# Patient Record
Sex: MALE | Race: WHITE | NOT HISPANIC OR LATINO | Employment: FULL TIME | URBAN - METROPOLITAN AREA
[De-identification: names, ages, dates, MRNs, and addresses within clinical notes are randomized per-mention and may not be internally consistent; named-entity substitution may affect disease eponyms.]

---

## 2023-08-10 ENCOUNTER — HOSPITAL ENCOUNTER (OUTPATIENT)
Dept: DATA CONVERSION | Facility: HOSPITAL | Age: 53
Discharge: HOME | End: 2023-08-10

## 2023-08-10 DIAGNOSIS — R73.01 IMPAIRED FASTING GLUCOSE: ICD-10-CM

## 2023-08-10 DIAGNOSIS — E78.00 PURE HYPERCHOLESTEROLEMIA, UNSPECIFIED: ICD-10-CM

## 2023-08-10 LAB
ALBUMIN SERPL-MCNC: 4.6 GM/DL (ref 3.5–5)
ALBUMIN/GLOB SERPL: 1.5 RATIO (ref 1.5–3)
ALP BLD-CCNC: 64 U/L (ref 35–125)
ALT SERPL-CCNC: 38 U/L (ref 5–40)
ANION GAP SERPL CALCULATED.3IONS-SCNC: 12 MMOL/L (ref 0–19)
APPEARANCE PLAS: CLEAR
AST SERPL-CCNC: 35 U/L (ref 5–40)
BILIRUB SERPL-MCNC: 1.1 MG/DL (ref 0.1–1.2)
BUN SERPL-MCNC: 16 MG/DL (ref 8–25)
BUN/CREAT SERPL: 17.8 RATIO (ref 8–21)
CALCIUM SERPL-MCNC: 9.6 MG/DL (ref 8.5–10.4)
CHLORIDE SERPL-SCNC: 94 MMOL/L (ref 97–107)
CHOLEST SERPL-MCNC: 191 MG/DL (ref 133–200)
CHOLEST/HDLC SERPL: 3 RATIO
CO2 SERPL-SCNC: 28 MMOL/L (ref 24–31)
COLOR SPUN FLD: YELLOW
CREAT SERPL-MCNC: 0.9 MG/DL (ref 0.4–1.6)
FASTING STATUS PATIENT QL REPORTED: NORMAL
GFR SERPL CREATININE-BSD FRML MDRD: 101 ML/MIN/1.73 M2
GLOBULIN SER-MCNC: 3 G/DL (ref 1.9–3.7)
GLUCOSE SERPL-MCNC: 91 MG/DL (ref 65–99)
HBA1C MFR BLD: 6.2 % (ref 4–6)
HDLC SERPL-MCNC: 63 MG/DL
LDLC SERPL CALC-MCNC: 106 MG/DL (ref 65–130)
POTASSIUM SERPL-SCNC: 3.7 MMOL/L (ref 3.4–5.1)
PROT SERPL-MCNC: 7.6 G/DL (ref 5.9–7.9)
SODIUM SERPL-SCNC: 134 MMOL/L (ref 133–145)
TRIGL SERPL-MCNC: 111 MG/DL (ref 40–150)

## 2023-09-16 PROBLEM — L65.9 ALOPECIA: Status: ACTIVE | Noted: 2023-09-16

## 2023-09-16 PROBLEM — R73.01 IMPAIRED FASTING GLUCOSE: Status: ACTIVE | Noted: 2023-09-16

## 2023-09-16 PROBLEM — G56.00 CARPAL TUNNEL SYNDROME: Status: ACTIVE | Noted: 2023-09-16

## 2023-09-16 PROBLEM — M62.9 DISORDER OF MUSCLE: Status: ACTIVE | Noted: 2023-09-16

## 2023-09-16 PROBLEM — E78.00 PURE HYPERCHOLESTEROLEMIA: Status: ACTIVE | Noted: 2023-09-16

## 2023-09-16 PROBLEM — I10 ESSENTIAL HYPERTENSION: Status: ACTIVE | Noted: 2023-09-16

## 2023-09-16 PROBLEM — E78.00 HIGH CHOLESTEROL: Status: ACTIVE | Noted: 2023-09-16

## 2023-09-16 PROBLEM — N50.3 CYST OF EPIDIDYMIS: Status: ACTIVE | Noted: 2023-09-16

## 2023-09-16 PROBLEM — G47.30 SLEEP APNEA: Status: ACTIVE | Noted: 2023-09-16

## 2023-09-16 RX ORDER — PHENYLPROPANOLAMINE/CLEMASTINE 75-1.34MG
200 TABLET, EXTENDED RELEASE ORAL
COMMUNITY

## 2023-09-16 RX ORDER — PRAVASTATIN SODIUM 40 MG/1
40 TABLET ORAL DAILY
COMMUNITY
Start: 2019-11-06

## 2023-09-16 RX ORDER — LOSARTAN POTASSIUM 50 MG/1
50 TABLET ORAL NIGHTLY
COMMUNITY
Start: 2021-01-06 | End: 2023-11-17

## 2023-09-16 RX ORDER — CHLORTHALIDONE 25 MG/1
25 TABLET ORAL EVERY MORNING
COMMUNITY
Start: 2022-01-28 | End: 2024-01-31

## 2023-11-17 DIAGNOSIS — I10 ESSENTIAL (PRIMARY) HYPERTENSION: ICD-10-CM

## 2023-11-17 RX ORDER — LOSARTAN POTASSIUM 50 MG/1
TABLET ORAL
Qty: 90 TABLET | Refills: 2 | Status: SHIPPED | OUTPATIENT
Start: 2023-11-17

## 2024-01-25 DIAGNOSIS — R73.01 IMPAIRED FASTING GLUCOSE: ICD-10-CM

## 2024-01-25 DIAGNOSIS — E78.00 PURE HYPERCHOLESTEROLEMIA, UNSPECIFIED: Primary | ICD-10-CM

## 2024-01-31 DIAGNOSIS — I10 ESSENTIAL (PRIMARY) HYPERTENSION: ICD-10-CM

## 2024-01-31 RX ORDER — CHLORTHALIDONE 25 MG/1
TABLET ORAL
Qty: 90 TABLET | Refills: 2 | Status: SHIPPED | OUTPATIENT
Start: 2024-01-31

## 2024-03-01 ENCOUNTER — LAB (OUTPATIENT)
Dept: LAB | Facility: LAB | Age: 54
End: 2024-03-01
Payer: COMMERCIAL

## 2024-03-01 DIAGNOSIS — E78.00 PURE HYPERCHOLESTEROLEMIA, UNSPECIFIED: ICD-10-CM

## 2024-03-01 DIAGNOSIS — R73.01 IMPAIRED FASTING GLUCOSE: ICD-10-CM

## 2024-03-01 LAB
ALBUMIN SERPL-MCNC: 4.6 G/DL (ref 3.5–5)
ALP BLD-CCNC: 67 U/L (ref 35–125)
ALT SERPL-CCNC: 28 U/L (ref 5–40)
ANION GAP SERPL CALC-SCNC: 12 MMOL/L
AST SERPL-CCNC: 28 U/L (ref 5–40)
BILIRUB SERPL-MCNC: 0.8 MG/DL (ref 0.1–1.2)
BUN SERPL-MCNC: 16 MG/DL (ref 8–25)
CALCIUM SERPL-MCNC: 9.9 MG/DL (ref 8.5–10.4)
CHLORIDE SERPL-SCNC: 97 MMOL/L (ref 97–107)
CHOLEST SERPL-MCNC: 174 MG/DL (ref 133–200)
CHOLEST/HDLC SERPL: 3.3 {RATIO}
CO2 SERPL-SCNC: 28 MMOL/L (ref 24–31)
CREAT SERPL-MCNC: 1 MG/DL (ref 0.4–1.6)
EGFRCR SERPLBLD CKD-EPI 2021: 89 ML/MIN/1.73M*2
EST. AVERAGE GLUCOSE BLD GHB EST-MCNC: 128 MG/DL
GLUCOSE SERPL-MCNC: 94 MG/DL (ref 65–99)
HBA1C MFR BLD: 6.1 %
HDLC SERPL-MCNC: 53 MG/DL
LDLC SERPL CALC-MCNC: 102 MG/DL (ref 65–130)
POTASSIUM SERPL-SCNC: 4.4 MMOL/L (ref 3.4–5.1)
PROT SERPL-MCNC: 7 G/DL (ref 5.9–7.9)
SODIUM SERPL-SCNC: 137 MMOL/L (ref 133–145)
TRIGL SERPL-MCNC: 96 MG/DL (ref 40–150)

## 2024-03-01 PROCEDURE — 80061 LIPID PANEL: CPT

## 2024-03-01 PROCEDURE — 83036 HEMOGLOBIN GLYCOSYLATED A1C: CPT

## 2024-03-01 PROCEDURE — 36415 COLL VENOUS BLD VENIPUNCTURE: CPT

## 2024-03-01 PROCEDURE — 80053 COMPREHEN METABOLIC PANEL: CPT

## 2024-03-20 ENCOUNTER — APPOINTMENT (OUTPATIENT)
Dept: PRIMARY CARE | Facility: CLINIC | Age: 54
End: 2024-03-20
Payer: COMMERCIAL

## 2024-04-10 ENCOUNTER — APPOINTMENT (OUTPATIENT)
Dept: PRIMARY CARE | Facility: CLINIC | Age: 54
End: 2024-04-10
Payer: COMMERCIAL

## 2024-04-15 PROBLEM — N52.9 ERECTILE DYSFUNCTION: Status: ACTIVE | Noted: 2024-04-15

## 2024-04-15 RX ORDER — SILDENAFIL 100 MG/1
TABLET, FILM COATED ORAL
COMMUNITY

## 2024-04-16 ENCOUNTER — OFFICE VISIT (OUTPATIENT)
Dept: PRIMARY CARE | Facility: CLINIC | Age: 54
End: 2024-04-16
Payer: COMMERCIAL

## 2024-04-16 VITALS
DIASTOLIC BLOOD PRESSURE: 73 MMHG | TEMPERATURE: 97.9 F | OXYGEN SATURATION: 97 % | SYSTOLIC BLOOD PRESSURE: 114 MMHG | HEART RATE: 91 BPM | HEIGHT: 74 IN | BODY MASS INDEX: 25.93 KG/M2 | WEIGHT: 202 LBS

## 2024-04-16 DIAGNOSIS — R73.01 IMPAIRED FASTING GLUCOSE: ICD-10-CM

## 2024-04-16 DIAGNOSIS — I10 ESSENTIAL HYPERTENSION: ICD-10-CM

## 2024-04-16 DIAGNOSIS — Z00.00 ROUTINE MEDICAL EXAM: Primary | ICD-10-CM

## 2024-04-16 DIAGNOSIS — E78.00 HIGH CHOLESTEROL: ICD-10-CM

## 2024-04-16 DIAGNOSIS — Z12.5 SCREENING FOR PROSTATE CANCER: ICD-10-CM

## 2024-04-16 PROCEDURE — 3074F SYST BP LT 130 MM HG: CPT | Performed by: INTERNAL MEDICINE

## 2024-04-16 PROCEDURE — 99396 PREV VISIT EST AGE 40-64: CPT | Performed by: INTERNAL MEDICINE

## 2024-04-16 PROCEDURE — 1036F TOBACCO NON-USER: CPT | Performed by: INTERNAL MEDICINE

## 2024-04-16 PROCEDURE — 3078F DIAST BP <80 MM HG: CPT | Performed by: INTERNAL MEDICINE

## 2024-04-16 ASSESSMENT — ENCOUNTER SYMPTOMS
ABDOMINAL PAIN: 0
TREMORS: 0
PALPITATIONS: 0
FEVER: 0
PHOTOPHOBIA: 0
HEADACHES: 0
FREQUENCY: 0
WEAKNESS: 0
ARTHRALGIAS: 0
COLOR CHANGE: 0
DYSURIA: 0
COUGH: 0
CHILLS: 0
SHORTNESS OF BREATH: 0
BRUISES/BLEEDS EASILY: 0
DIARRHEA: 0

## 2024-04-16 ASSESSMENT — PATIENT HEALTH QUESTIONNAIRE - PHQ9
1. LITTLE INTEREST OR PLEASURE IN DOING THINGS: NOT AT ALL
SUM OF ALL RESPONSES TO PHQ9 QUESTIONS 1 AND 2: 0
2. FEELING DOWN, DEPRESSED OR HOPELESS: NOT AT ALL

## 2024-04-16 ASSESSMENT — PAIN SCALES - GENERAL: PAINLEVEL: 0-NO PAIN

## 2024-04-16 NOTE — PROGRESS NOTES
[facility]: [facility]  PHYSICAL EXAM      Abhi Stern is a 54 y.o. male that is presenting today for Annual Exam.    Assessment/Plan   Diagnoses and all orders for this visit:  Routine medical exam  Comments:  Dermatology for yearly skin check good idea.  Screening for prostate cancer  -     PSA; Future  Essential hypertension  Comments:  BP doing well.  High cholesterol  Comments:  Doing well, pravastatin.  Orders:  -     Comprehensive Metabolic Panel; Future  -     Lipid Panel; Future  Impaired fasting glucose  Comments:  HGA1C 6.1%, low sugar avod Juices, sweets. Limit potatoes, breads, pastas and rice. Walking 30 minutes after meals.  Orders:  -     CBC and Auto Differential; Future  -     Hemoglobin A1C; Future    Subjective   Wellness, Diet, exercise. HTN, chol, Doing well. Work, family doing well.    Review of Systems   Constitutional:  Negative for chills and fever.   HENT:  Negative for congestion.    Eyes:  Negative for photophobia and visual disturbance.   Respiratory:  Negative for cough and shortness of breath.    Cardiovascular:  Negative for chest pain and palpitations.   Gastrointestinal:  Negative for abdominal pain and diarrhea.   Endocrine: Negative for cold intolerance and heat intolerance.   Genitourinary:  Negative for dysuria and frequency.   Musculoskeletal:  Negative for arthralgias.   Skin:  Negative for color change.   Neurological:  Negative for tremors, weakness and headaches.   Hematological:  Does not bruise/bleed easily.      Objective   Vitals:    04/16/24 1658   BP: 114/73   Pulse: 91   Temp: 36.6 °C (97.9 °F)   SpO2: 97%     Body mass index is 25.94 kg/m².  Physical Exam  Constitutional:       General: He is not in acute distress.     Appearance: He is not toxic-appearing.   HENT:      Head: Normocephalic and atraumatic.      Right Ear: Tympanic membrane and ear canal normal.      Left Ear: Tympanic membrane and ear canal normal.      Nose: Nose normal.       Mouth/Throat:      Pharynx: Oropharynx is clear.   Eyes:      Extraocular Movements: Extraocular movements intact.      Pupils: Pupils are equal, round, and reactive to light.   Cardiovascular:      Rate and Rhythm: Normal rate and regular rhythm.      Heart sounds: Normal heart sounds. No murmur heard.  Pulmonary:      Breath sounds: Normal breath sounds.   Abdominal:      General: Bowel sounds are normal. There is no distension.      Palpations: Abdomen is soft. There is no mass.      Tenderness: There is no abdominal tenderness.   Musculoskeletal:         General: Deformity (pectus excavatum) present. No swelling or tenderness.      Right lower leg: No edema.      Left lower leg: No edema.   Skin:     General: Skin is warm and dry.   Neurological:      General: No focal deficit present.      Mental Status: He is oriented to person, place, and time.      Sensory: No sensory deficit.      Motor: No weakness.      Deep Tendon Reflexes: Reflexes normal.     Diagnostic Results   Lab Results   Component Value Date    GLUCOSE 94 03/01/2024    CALCIUM 9.9 03/01/2024     03/01/2024    K 4.4 03/01/2024    CO2 28 03/01/2024    CL 97 03/01/2024    BUN 16 03/01/2024    CREATININE 1.00 03/01/2024     Lab Results   Component Value Date    ALT 28 03/01/2024    AST 28 03/01/2024    ALKPHOS 67 03/01/2024    BILITOT 0.8 03/01/2024     Lab Results   Component Value Date    WBC 6.5 02/17/2023    HGB 15.7 02/17/2023    HCT 48.3 02/17/2023    MCV 91.8 02/17/2023     02/17/2023     Lab Results   Component Value Date    CHOL 174 03/01/2024    CHOL 191 08/10/2023    CHOL 216 (H) 02/17/2023     Lab Results   Component Value Date    HDL 53.0 03/01/2024    HDL 63 08/10/2023    HDL 55 02/17/2023     Lab Results   Component Value Date    LDLCALC 102 03/01/2024    LDLCALC 106 08/10/2023    LDLCALC 138 (H) 02/17/2023     Lab Results   Component Value Date    TRIG 96 03/01/2024    TRIG 111 08/10/2023    TRIG 115 02/17/2023  "    No components found for: \"CHOLHDL\"  Lab Results   Component Value Date    HGBA1C 6.1 (H) 03/01/2024     Other labs not included in the list above were reviewed either before or during this encounter.    History   History reviewed. No pertinent past medical history.  History reviewed. No pertinent surgical history.  Family History   Problem Relation Name Age of Onset   • Hypertension Father     • Heart disease Father       Social History     Socioeconomic History   • Marital status:      Spouse name: Not on file   • Number of children: Not on file   • Years of education: Not on file   • Highest education level: Not on file   Occupational History   • Not on file   Tobacco Use   • Smoking status: Never     Passive exposure: Past   • Smokeless tobacco: Never   Vaping Use   • Vaping status: Never Used   Substance and Sexual Activity   • Alcohol use: Yes   • Drug use: Never   • Sexual activity: Not on file   Other Topics Concern   • Not on file   Social History Narrative   • Not on file     Social Determinants of Health     Financial Resource Strain: Not on file   Food Insecurity: Not on file   Transportation Needs: Not on file   Physical Activity: Not on file   Stress: Not on file   Social Connections: Not on file   Intimate Partner Violence: Not on file   Housing Stability: Not on file     Allergies   Allergen Reactions   • Atorvastatin Other     mental fog     Current Outpatient Medications on File Prior to Visit   Medication Sig Dispense Refill   • chlorthalidone (Hygroton) 25 mg tablet TAKE 1 TABLET BY MOUTH EVERY DAY IN THE MORNING WITH FOOD FOR 90 DAYS 90 tablet 2   • ibuprofen (Advil Migraine) capsule Take 1 capsule (200 mg) by mouth. As needed     • losartan (Cozaar) 50 mg tablet TAKE 1 TABLET BY MOUTH EVERY DAY AT BEDTIME FOR 90 DAYS 90 tablet 2   • multivitamin capsule Take 1 capsule by mouth once daily.     • pravastatin (Pravachol) 40 mg tablet Take 1 tablet (40 mg) by mouth once daily.     • " sildenafil (Viagra) 100 mg tablet Take by mouth.       No current facility-administered medications on file prior to visit.     Immunization History   Administered Date(s) Administered   • Flu vaccine, quadrivalent, no egg protein, age 6 month or greater (FLUCELVAX) 01/06/2021   • Influenza, injectable, quadrivalent 10/03/2014   • Influenza, seasonal, injectable 01/25/2013   • Pfizer Purple Cap SARS-CoV-2 10/18/2021, 11/08/2021   • Tdap vaccine, age 7 year and older (BOOSTRIX, ADACEL) 07/08/2009, 11/01/2019     Patient's medical history was reviewed and updated either before or during this encounter.       Cirilo Mustafa MD

## 2024-04-16 NOTE — PATIENT INSTRUCTIONS
Late September labs 1 week before apt.        Shingrix shingles vaccine series of 2  by 2 to 3 months, Can be obtained at the local pharmacies.      Diagnoses and all orders for this visit:  Routine medical exam  Comments:  Dermatology for yearly skin check good idea.  Screening for prostate cancer  -     PSA; Future  Essential hypertension  Comments:  BP doing well.  High cholesterol  Comments:  Doing well, pravastatin.  Orders:  -     Comprehensive Metabolic Panel; Future  -     Lipid Panel; Future  Impaired fasting glucose  Comments:  HGA1C 6.1%, low sugar avod Juices, sweets. Limit potatoes, breads, pastas and rice. Walking 30 minutes after meals.  Orders:  -     CBC and Auto Differential; Future  -     Hemoglobin A1C; Future   
.

## 2024-06-24 DIAGNOSIS — E78.00 PURE HYPERCHOLESTEROLEMIA, UNSPECIFIED: ICD-10-CM

## 2024-06-25 RX ORDER — PRAVASTATIN SODIUM 40 MG/1
40 TABLET ORAL DAILY
Qty: 90 TABLET | Refills: 0 | Status: SHIPPED | OUTPATIENT
Start: 2024-06-25

## 2024-08-22 DIAGNOSIS — I10 ESSENTIAL (PRIMARY) HYPERTENSION: ICD-10-CM

## 2024-08-22 RX ORDER — LOSARTAN POTASSIUM 50 MG/1
50 TABLET ORAL NIGHTLY
Qty: 90 TABLET | Refills: 2 | Status: SHIPPED | OUTPATIENT
Start: 2024-08-22

## 2024-09-25 ENCOUNTER — OFFICE VISIT (OUTPATIENT)
Dept: PRIMARY CARE | Facility: CLINIC | Age: 54
End: 2024-09-25
Payer: COMMERCIAL

## 2024-09-25 VITALS
BODY MASS INDEX: 24.78 KG/M2 | OXYGEN SATURATION: 99 % | SYSTOLIC BLOOD PRESSURE: 132 MMHG | WEIGHT: 193 LBS | HEART RATE: 72 BPM | DIASTOLIC BLOOD PRESSURE: 84 MMHG

## 2024-09-25 DIAGNOSIS — Z13.1 SCREENING FOR DIABETES MELLITUS: ICD-10-CM

## 2024-09-25 DIAGNOSIS — Z23 ENCOUNTER FOR IMMUNIZATION: ICD-10-CM

## 2024-09-25 DIAGNOSIS — I10 ESSENTIAL HYPERTENSION: ICD-10-CM

## 2024-09-25 DIAGNOSIS — E78.2 MIXED HYPERLIPIDEMIA: Primary | ICD-10-CM

## 2024-09-25 DIAGNOSIS — Z12.5 SCREENING FOR PROSTATE CANCER: ICD-10-CM

## 2024-09-25 DIAGNOSIS — E78.00 PURE HYPERCHOLESTEROLEMIA, UNSPECIFIED: ICD-10-CM

## 2024-09-25 PROBLEM — N50.3 CYST OF EPIDIDYMIS: Status: RESOLVED | Noted: 2023-09-16 | Resolved: 2024-09-25

## 2024-09-25 PROBLEM — G56.00 CARPAL TUNNEL SYNDROME: Status: RESOLVED | Noted: 2023-09-16 | Resolved: 2024-09-25

## 2024-09-25 PROCEDURE — 3075F SYST BP GE 130 - 139MM HG: CPT | Performed by: INTERNAL MEDICINE

## 2024-09-25 PROCEDURE — 3079F DIAST BP 80-89 MM HG: CPT | Performed by: INTERNAL MEDICINE

## 2024-09-25 PROCEDURE — 90656 IIV3 VACC NO PRSV 0.5 ML IM: CPT | Performed by: INTERNAL MEDICINE

## 2024-09-25 PROCEDURE — 90471 IMMUNIZATION ADMIN: CPT | Performed by: INTERNAL MEDICINE

## 2024-09-25 PROCEDURE — 99214 OFFICE O/P EST MOD 30 MIN: CPT | Performed by: INTERNAL MEDICINE

## 2024-09-25 RX ORDER — PRAVASTATIN SODIUM 40 MG/1
40 TABLET ORAL DAILY
Qty: 90 TABLET | Refills: 1 | Status: SHIPPED | OUTPATIENT
Start: 2024-09-25

## 2024-09-25 ASSESSMENT — LIFESTYLE VARIABLES
AUDIT-C TOTAL SCORE: 1
HOW OFTEN DO YOU HAVE A DRINK CONTAINING ALCOHOL: MONTHLY OR LESS
HOW OFTEN DO YOU HAVE SIX OR MORE DRINKS ON ONE OCCASION: NEVER
HOW MANY STANDARD DRINKS CONTAINING ALCOHOL DO YOU HAVE ON A TYPICAL DAY: 1 OR 2
SKIP TO QUESTIONS 9-10: 1

## 2024-09-25 ASSESSMENT — ENCOUNTER SYMPTOMS
DEPRESSION: 0
LOSS OF SENSATION IN FEET: 0
OCCASIONAL FEELINGS OF UNSTEADINESS: 0

## 2024-09-25 ASSESSMENT — PAIN SCALES - GENERAL: PAINLEVEL: 0-NO PAIN

## 2024-09-25 ASSESSMENT — PATIENT HEALTH QUESTIONNAIRE - PHQ9
SUM OF ALL RESPONSES TO PHQ9 QUESTIONS 1 AND 2: 0
2. FEELING DOWN, DEPRESSED OR HOPELESS: NOT AT ALL
1. LITTLE INTEREST OR PLEASURE IN DOING THINGS: NOT AT ALL

## 2024-09-25 NOTE — PROGRESS NOTES
[facility]: [facility]  PROGRESS NOTE      Abhi Stern is a 54 y.o. male that is being seen  today for Establish Care.  Subjective   Patient is a 54-year-old male with history of hypertension and hyperlipidemia who is being seen for follow-up visit as well as to establish a new primary care physician.  Patient's blood pressure is fairly controlled with current medication.  Patient lab work reviewed and cholesterol levels have been stable.  Patient denies any issues with the medications.  Patient is due for flu shot.  PSA level has been stable.  Patient is up-to-date on screening colonoscopy      ROS  Negative for fever or chills  Negative for sore throat, ear pain, nasal discharge  Negative for cough, shortness of breath or wheezing  Negative for chest pain, palpitations, swelling of legs  Negative for abdominal pain, constipation, diarrhea, blood in the stools  Negative for urinary complaints  Negative for headache, dizziness, weakness or numbness  Negative for joint pain  Negative for depression or anxiety  All other systems reviewed and were negative   Vitals:    09/25/24 1623   BP: 132/84   Pulse: 72   SpO2: 99%      Vitals:    09/25/24 1623   Weight: 87.5 kg (193 lb)     Body mass index is 24.78 kg/m².  Physical Exam  Constitutional: Patient does not appear to be in any acute distress  Head and Face: NCAT  Eyes: Normal external exam, EOMI  ENT: Normal external inspection of ears and nose. Oropharynx normal.  Cardiovascular: RRR, S1/S2, no murmurs, rubs, or gallops, radial pulses +2, no edema of extremities  Pulmonary: CTAB, no respiratory distress.  Abdomen: +BS, soft, non-tender, nondistended, no guarding or rebound, no masses noted  MSK: No joint swelling, normal movements of all extremities. Range of motion- normal.  Skin- No lesions, contusions, or erythema.  Peripheral puslses palpable bilaterally 2+  Neuro: AAO X3, Cranial nerves 2-12 grossly intact,DTR 2+ in all 4 limbs    Psychiatric: Judgment intact. Appropriate mood and behavior    LABS   [unfilled]  Lab Results   Component Value Date    GLUCOSE 94 03/01/2024    CALCIUM 9.9 03/01/2024     03/01/2024    K 4.4 03/01/2024    CO2 28 03/01/2024    CL 97 03/01/2024    BUN 16 03/01/2024    CREATININE 1.00 03/01/2024     Lab Results   Component Value Date    ALT 28 03/01/2024    AST 28 03/01/2024    ALKPHOS 67 03/01/2024    BILITOT 0.8 03/01/2024     Lab Results   Component Value Date    WBC 6.5 02/17/2023    HGB 15.7 02/17/2023    HCT 48.3 02/17/2023    MCV 91.8 02/17/2023     02/17/2023     Lab Results   Component Value Date    CHOL 174 03/01/2024    CHOL 191 08/10/2023    CHOL 216 (H) 02/17/2023     Lab Results   Component Value Date    HDL 53.0 03/01/2024    HDL 63 08/10/2023    HDL 55 02/17/2023     Lab Results   Component Value Date    LDLCALC 102 03/01/2024    LDLCALC 106 08/10/2023    LDLCALC 138 (H) 02/17/2023     Lab Results   Component Value Date    TRIG 96 03/01/2024    TRIG 111 08/10/2023    TRIG 115 02/17/2023     Lab Results   Component Value Date    HGBA1C 6.1 (H) 03/01/2024     Other labs not included in the list above were reviewed either before or during this encounter.    History    Past Medical History:   Diagnosis Date    Carpal tunnel syndrome 09/16/2023    Cyst of epididymis 09/16/2023     No past surgical history on file.  Family History   Problem Relation Name Age of Onset    Hypertension Father      Heart disease Father       Allergies   Allergen Reactions    Atorvastatin Other     mental fog     Current Outpatient Medications on File Prior to Visit   Medication Sig Dispense Refill    chlorthalidone (Hygroton) 25 mg tablet TAKE 1 TABLET BY MOUTH EVERY DAY IN THE MORNING WITH FOOD FOR 90 DAYS 90 tablet 2    ibuprofen (Advil Migraine) capsule Take 1 capsule (200 mg) by mouth. As needed      losartan (Cozaar) 50 mg tablet TAKE 1 TABLET BY MOUTH EVERY DAY AT BEDTIME 90 tablet 2    multivitamin capsule  Take 1 capsule by mouth once daily.      sildenafil (Viagra) 100 mg tablet Take by mouth.      [DISCONTINUED] pravastatin (Pravachol) 40 mg tablet TAKE 1 TABLET BY MOUTH EVERY DAY 90 tablet 0     No current facility-administered medications on file prior to visit.     Immunization History   Administered Date(s) Administered    Flu vaccine, quadrivalent, no egg protein, age 6 month or greater (FLUCELVAX) 01/06/2021    Influenza, injectable, quadrivalent 10/03/2014    Influenza, seasonal, injectable 01/25/2013    Pfizer Purple Cap SARS-CoV-2 10/18/2021, 11/08/2021    Tdap vaccine, age 7 year and older (BOOSTRIX, ADACEL) 07/08/2009, 11/01/2019     Patient's medical history was reviewed and updated either before or during this encounter.  ASSESSMENT / PLAN:  Diagnoses and all orders for this visit:  Mixed hyperlipidemia  -     CBC; Future  -     Comprehensive Metabolic Panel; Future  -     Lipid Panel; Future  Encounter for immunization  Essential hypertension  -     CBC; Future  -     Comprehensive Metabolic Panel; Future  Screening for diabetes mellitus  -     Hemoglobin A1C; Future  Screening for prostate cancer  -     Prostate Specific Antigen; Future  Other orders  -     Flu vaccine, trivalent, preservative free, age 6 months and greater (Fluarix/Fluzone/Flulaval)    Patient seen for follow-up visit.  Patient has been doing well.  Blood pressure is fairly controlled with current medication.  Cholesterol levels have been stable.  Patient will be seen in 1 year for follow-up.      Anthony Wolfe MD

## 2024-09-27 ENCOUNTER — LAB (OUTPATIENT)
Dept: LAB | Facility: LAB | Age: 54
End: 2024-09-27
Payer: COMMERCIAL

## 2024-09-27 DIAGNOSIS — E78.2 MIXED HYPERLIPIDEMIA: ICD-10-CM

## 2024-09-27 DIAGNOSIS — Z12.5 SCREENING FOR PROSTATE CANCER: ICD-10-CM

## 2024-09-27 DIAGNOSIS — Z13.1 SCREENING FOR DIABETES MELLITUS: ICD-10-CM

## 2024-09-27 LAB
ALBUMIN SERPL BCP-MCNC: 4.8 G/DL (ref 3.4–5)
ALP SERPL-CCNC: 55 U/L (ref 33–120)
ALT SERPL W P-5'-P-CCNC: 31 U/L (ref 10–52)
ANION GAP SERPL CALCULATED.3IONS-SCNC: 11 MMOL/L (ref 10–20)
AST SERPL W P-5'-P-CCNC: 29 U/L (ref 9–39)
BILIRUB SERPL-MCNC: 1 MG/DL (ref 0–1.2)
BUN SERPL-MCNC: 16 MG/DL (ref 6–23)
CALCIUM SERPL-MCNC: 10.1 MG/DL (ref 8.6–10.3)
CHLORIDE SERPL-SCNC: 97 MMOL/L (ref 98–107)
CHOLEST SERPL-MCNC: 193 MG/DL (ref 0–199)
CHOLEST/HDLC SERPL: 3.1 {RATIO}
CO2 SERPL-SCNC: 33 MMOL/L (ref 21–32)
CREAT SERPL-MCNC: 0.78 MG/DL (ref 0.5–1.3)
EGFRCR SERPLBLD CKD-EPI 2021: >90 ML/MIN/1.73M*2
ERYTHROCYTE [DISTWIDTH] IN BLOOD BY AUTOMATED COUNT: 12.9 % (ref 11.5–14.5)
EST. AVERAGE GLUCOSE BLD GHB EST-MCNC: 117 MG/DL
GLUCOSE SERPL-MCNC: 97 MG/DL (ref 74–99)
HBA1C MFR BLD: 5.7 %
HCT VFR BLD AUTO: 47.5 % (ref 41–52)
HDLC SERPL-MCNC: 62.3 MG/DL
HGB BLD-MCNC: 15.6 G/DL (ref 13.5–17.5)
LDLC SERPL CALC-MCNC: 105 MG/DL
MCH RBC QN AUTO: 29.7 PG (ref 26–34)
MCHC RBC AUTO-ENTMCNC: 32.8 G/DL (ref 32–36)
MCV RBC AUTO: 90 FL (ref 80–100)
NON HDL CHOLESTEROL: 131 MG/DL (ref 0–149)
NRBC BLD-RTO: 0 /100 WBCS (ref 0–0)
PLATELET # BLD AUTO: 259 X10*3/UL (ref 150–450)
POTASSIUM SERPL-SCNC: 5.3 MMOL/L (ref 3.5–5.3)
PROT SERPL-MCNC: 7.5 G/DL (ref 6.4–8.2)
PSA SERPL-MCNC: 0.79 NG/ML
RBC # BLD AUTO: 5.26 X10*6/UL (ref 4.5–5.9)
SODIUM SERPL-SCNC: 136 MMOL/L (ref 136–145)
TRIGL SERPL-MCNC: 131 MG/DL (ref 0–149)
VLDL: 26 MG/DL (ref 0–40)
WBC # BLD AUTO: 5.8 X10*3/UL (ref 4.4–11.3)

## 2024-09-27 PROCEDURE — 80061 LIPID PANEL: CPT

## 2024-09-27 PROCEDURE — 83036 HEMOGLOBIN GLYCOSYLATED A1C: CPT

## 2024-09-27 PROCEDURE — 84153 ASSAY OF PSA TOTAL: CPT

## 2024-09-27 PROCEDURE — 80053 COMPREHEN METABOLIC PANEL: CPT

## 2024-09-27 PROCEDURE — 36415 COLL VENOUS BLD VENIPUNCTURE: CPT

## 2024-09-27 PROCEDURE — 85027 COMPLETE CBC AUTOMATED: CPT

## 2024-09-30 ENCOUNTER — TELEPHONE (OUTPATIENT)
Dept: PRIMARY CARE | Facility: CLINIC | Age: 54
End: 2024-09-30
Payer: COMMERCIAL

## 2024-10-09 DIAGNOSIS — N52.9 ERECTILE DYSFUNCTION, UNSPECIFIED ERECTILE DYSFUNCTION TYPE: Primary | ICD-10-CM

## 2024-10-09 RX ORDER — SILDENAFIL 100 MG/1
100 TABLET, FILM COATED ORAL DAILY PRN
Qty: 4 TABLET | Refills: 3 | Status: SHIPPED | OUTPATIENT
Start: 2024-10-09

## 2024-11-20 DIAGNOSIS — I10 ESSENTIAL (PRIMARY) HYPERTENSION: ICD-10-CM

## 2024-11-20 RX ORDER — CHLORTHALIDONE 25 MG/1
25 TABLET ORAL
Qty: 90 TABLET | Refills: 1 | Status: SHIPPED | OUTPATIENT
Start: 2024-11-20

## 2025-02-18 DIAGNOSIS — E78.00 PURE HYPERCHOLESTEROLEMIA, UNSPECIFIED: ICD-10-CM

## 2025-02-18 RX ORDER — PRAVASTATIN SODIUM 40 MG/1
40 TABLET ORAL DAILY
Qty: 90 TABLET | Refills: 1 | Status: SHIPPED | OUTPATIENT
Start: 2025-02-18

## 2025-04-15 ENCOUNTER — TELEPHONE (OUTPATIENT)
Dept: PRIMARY CARE | Facility: CLINIC | Age: 55
End: 2025-04-15
Payer: COMMERCIAL

## 2025-04-15 DIAGNOSIS — I10 ESSENTIAL HYPERTENSION: Primary | ICD-10-CM

## 2025-04-15 DIAGNOSIS — E78.00 PURE HYPERCHOLESTEROLEMIA: ICD-10-CM

## 2025-04-15 ASSESSMENT — PROMIS GLOBAL HEALTH SCALE
RATE_QUALITY_OF_LIFE: VERY GOOD
CARRYOUT_PHYSICAL_ACTIVITIES: COMPLETELY
CARRYOUT_SOCIAL_ACTIVITIES: VERY GOOD
RATE_GENERAL_HEALTH: VERY GOOD
RATE_SOCIAL_SATISFACTION: VERY GOOD
RATE_PHYSICAL_HEALTH: VERY GOOD
RATE_AVERAGE_PAIN: 2
RATE_AVERAGE_FATIGUE: MILD
RATE_MENTAL_HEALTH: VERY GOOD
EMOTIONAL_PROBLEMS: RARELY

## 2025-04-15 NOTE — TELEPHONE ENCOUNTER
Coming in ppt Tuesday next week, no labs ordered pt calling to see what you would like ordered please advise,    [phone]

## 2025-04-17 LAB
ALBUMIN SERPL-MCNC: 4.7 G/DL (ref 3.6–5.1)
ALBUMIN SERPL-MCNC: 4.7 G/DL (ref 3.6–5.1)
ALP SERPL-CCNC: 54 U/L (ref 35–144)
ALP SERPL-CCNC: 57 U/L (ref 35–144)
ALT SERPL-CCNC: 25 U/L (ref 9–46)
ALT SERPL-CCNC: 26 U/L (ref 9–46)
ANION GAP SERPL CALCULATED.4IONS-SCNC: 7 MMOL/L (CALC) (ref 7–17)
ANION GAP SERPL CALCULATED.4IONS-SCNC: 8 MMOL/L (CALC) (ref 7–17)
AST SERPL-CCNC: 26 U/L (ref 10–35)
AST SERPL-CCNC: 27 U/L (ref 10–35)
BILIRUB SERPL-MCNC: 0.9 MG/DL (ref 0.2–1.2)
BILIRUB SERPL-MCNC: 0.9 MG/DL (ref 0.2–1.2)
BUN SERPL-MCNC: 18 MG/DL (ref 7–25)
BUN SERPL-MCNC: 18 MG/DL (ref 7–25)
CALCIUM SERPL-MCNC: 9.6 MG/DL (ref 8.6–10.3)
CALCIUM SERPL-MCNC: 9.6 MG/DL (ref 8.6–10.3)
CHLORIDE SERPL-SCNC: 99 MMOL/L (ref 98–110)
CHLORIDE SERPL-SCNC: 99 MMOL/L (ref 98–110)
CHOLEST SERPL-MCNC: 171 MG/DL
CHOLEST/HDLC SERPL: 2.7 (CALC)
CO2 SERPL-SCNC: 32 MMOL/L (ref 20–32)
CO2 SERPL-SCNC: 33 MMOL/L (ref 20–32)
CREAT SERPL-MCNC: 0.79 MG/DL (ref 0.7–1.3)
CREAT SERPL-MCNC: 0.82 MG/DL (ref 0.7–1.3)
EGFRCR SERPLBLD CKD-EPI 2021: 102 ML/MIN/1.73M2
EGFRCR SERPLBLD CKD-EPI 2021: 104 ML/MIN/1.73M2
ERYTHROCYTE [DISTWIDTH] IN BLOOD BY AUTOMATED COUNT: 12.4 % (ref 11–15)
GLUCOSE SERPL-MCNC: 94 MG/DL (ref 65–99)
GLUCOSE SERPL-MCNC: 96 MG/DL (ref 65–99)
HCT VFR BLD AUTO: 43.8 % (ref 38.5–50)
HDLC SERPL-MCNC: 64 MG/DL
HGB BLD-MCNC: 14.5 G/DL (ref 13.2–17.1)
LDLC SERPL CALC-MCNC: 90 MG/DL (CALC)
MCH RBC QN AUTO: 30.1 PG (ref 27–33)
MCHC RBC AUTO-ENTMCNC: 33.1 G/DL (ref 32–36)
MCV RBC AUTO: 91.1 FL (ref 80–100)
NONHDLC SERPL-MCNC: 107 MG/DL (CALC)
PLATELET # BLD AUTO: 249 THOUSAND/UL (ref 140–400)
PMV BLD REES-ECKER: 9.8 FL (ref 7.5–12.5)
POTASSIUM SERPL-SCNC: 4.2 MMOL/L (ref 3.5–5.3)
POTASSIUM SERPL-SCNC: 4.3 MMOL/L (ref 3.5–5.3)
PROT SERPL-MCNC: 7 G/DL (ref 6.1–8.1)
PROT SERPL-MCNC: 7 G/DL (ref 6.1–8.1)
RBC # BLD AUTO: 4.81 MILLION/UL (ref 4.2–5.8)
SODIUM SERPL-SCNC: 139 MMOL/L (ref 135–146)
SODIUM SERPL-SCNC: 139 MMOL/L (ref 135–146)
TRIGL SERPL-MCNC: 82 MG/DL
WBC # BLD AUTO: 5 THOUSAND/UL (ref 3.8–10.8)

## 2025-04-22 ENCOUNTER — OFFICE VISIT (OUTPATIENT)
Dept: PRIMARY CARE | Facility: CLINIC | Age: 55
End: 2025-04-22
Payer: COMMERCIAL

## 2025-04-22 VITALS
OXYGEN SATURATION: 98 % | DIASTOLIC BLOOD PRESSURE: 80 MMHG | HEART RATE: 77 BPM | SYSTOLIC BLOOD PRESSURE: 130 MMHG | BODY MASS INDEX: 24.38 KG/M2 | TEMPERATURE: 97.4 F | WEIGHT: 190 LBS | HEIGHT: 74 IN

## 2025-04-22 DIAGNOSIS — I10 ESSENTIAL (PRIMARY) HYPERTENSION: ICD-10-CM

## 2025-04-22 DIAGNOSIS — Z13.6 SCREENING FOR CARDIOVASCULAR CONDITION: ICD-10-CM

## 2025-04-22 DIAGNOSIS — R73.01 IMPAIRED FASTING GLUCOSE: ICD-10-CM

## 2025-04-22 DIAGNOSIS — N52.9 ERECTILE DYSFUNCTION, UNSPECIFIED ERECTILE DYSFUNCTION TYPE: ICD-10-CM

## 2025-04-22 DIAGNOSIS — Z12.5 SCREENING FOR PROSTATE CANCER: ICD-10-CM

## 2025-04-22 DIAGNOSIS — Z00.00 ANNUAL PHYSICAL EXAM: Primary | ICD-10-CM

## 2025-04-22 DIAGNOSIS — E78.00 PURE HYPERCHOLESTEROLEMIA, UNSPECIFIED: ICD-10-CM

## 2025-04-22 DIAGNOSIS — G47.30 SLEEP APNEA, UNSPECIFIED TYPE: ICD-10-CM

## 2025-04-22 PROCEDURE — 3008F BODY MASS INDEX DOCD: CPT | Performed by: INTERNAL MEDICINE

## 2025-04-22 PROCEDURE — 3079F DIAST BP 80-89 MM HG: CPT | Performed by: INTERNAL MEDICINE

## 2025-04-22 PROCEDURE — 99396 PREV VISIT EST AGE 40-64: CPT | Performed by: INTERNAL MEDICINE

## 2025-04-22 PROCEDURE — 3075F SYST BP GE 130 - 139MM HG: CPT | Performed by: INTERNAL MEDICINE

## 2025-04-22 RX ORDER — SILDENAFIL 100 MG/1
100 TABLET, FILM COATED ORAL DAILY PRN
Qty: 4 TABLET | Refills: 3 | Status: SHIPPED | OUTPATIENT
Start: 2025-04-22 | End: 2025-04-22 | Stop reason: WASHOUT

## 2025-04-22 RX ORDER — PRAVASTATIN SODIUM 40 MG/1
40 TABLET ORAL DAILY
Qty: 90 TABLET | Refills: 3 | Status: SHIPPED | OUTPATIENT
Start: 2025-04-22

## 2025-04-22 RX ORDER — LOSARTAN POTASSIUM 50 MG/1
50 TABLET ORAL NIGHTLY
Qty: 90 TABLET | Refills: 3 | Status: SHIPPED | OUTPATIENT
Start: 2025-04-22

## 2025-04-22 RX ORDER — CHLORTHALIDONE 25 MG/1
25 TABLET ORAL
Qty: 90 TABLET | Refills: 3 | Status: SHIPPED | OUTPATIENT
Start: 2025-04-22

## 2025-04-22 ASSESSMENT — PATIENT HEALTH QUESTIONNAIRE - PHQ9
SUM OF ALL RESPONSES TO PHQ9 QUESTIONS 1 AND 2: 0
1. LITTLE INTEREST OR PLEASURE IN DOING THINGS: NOT AT ALL
2. FEELING DOWN, DEPRESSED OR HOPELESS: NOT AT ALL

## 2025-04-22 ASSESSMENT — ENCOUNTER SYMPTOMS
DEPRESSION: 0
LOSS OF SENSATION IN FEET: 0
OCCASIONAL FEELINGS OF UNSTEADINESS: 0

## 2025-04-22 ASSESSMENT — PAIN SCALES - GENERAL: PAINLEVEL_OUTOF10: 0-NO PAIN

## 2025-05-01 NOTE — PROGRESS NOTES
[facility]: [facility]  PROGRESS NOTE      Abhi Stern is a 55 y.o. male that is being seen  today for Annual Exam.  Subjective     Patient is a 55-year-old male with history of hypertension and hyperlipidemia who is being seen for annual physical examination.  Patient's previous lab work reviewed and has been stable.  Patient recent lab work shows normal kidney functions.  Patient blood pressure is fairly controlled with current medications.  Patient is on statins and liver enzymes have been stable.  Patient is up-to-date on screening and immunizations.  Patient denies any hospitalization.      ROS  Negative for fever or chills  Negative for sore throat, ear pain, nasal discharge  Negative for cough, shortness of breath or wheezing  Negative for chest pain, palpitations, swelling of legs  Negative for abdominal pain, constipation, diarrhea, blood in the stools  Negative for urinary complaints  Negative for headache, dizziness, weakness or numbness  Negative for joint pain  Negative for depression or anxiety  All other systems reviewed and were negative   Vitals:    04/22/25 1629   BP: 130/80   Pulse: 77   Temp: 36.3 °C (97.4 °F)   SpO2: 98%      Vitals:    04/22/25 1629   Weight: 86.2 kg (190 lb)     Body mass index is 24.39 kg/m².  Physical Exam  Constitutional: Patient does not appear to be in any acute distress  Head and Face: NCAT  Eyes: Normal external exam, EOMI  ENT: Normal external inspection of ears and nose. Oropharynx normal.  Cardiovascular: RRR, S1/S2, no murmurs, rubs, or gallops, radial pulses +2, no edema of extremities  Pulmonary: CTAB, no respiratory distress.  Abdomen: +BS, soft, non-tender, nondistended, no guarding or rebound, no masses noted  MSK: No joint swelling, normal movements of all extremities. Range of motion- normal.  Skin- No lesions, contusions, or erythema.  Peripheral puslses palpable bilaterally 2+  Neuro: AAO X3, Cranial nerves 2-12 grossly  intact,DTR 2+ in all 4 limbs   Psychiatric: Judgment intact. Appropriate mood and behavior    LABS   [unfilled]  Lab Results   Component Value Date    GLUCOSE 94 04/17/2025    CALCIUM 9.6 04/17/2025     04/17/2025    K 4.2 04/17/2025    CO2 32 04/17/2025    CL 99 04/17/2025    BUN 18 04/17/2025    CREATININE 0.82 04/17/2025     Lab Results   Component Value Date    ALT 25 04/17/2025    AST 26 04/17/2025    ALKPHOS 54 04/17/2025    BILITOT 0.9 04/17/2025     Lab Results   Component Value Date    WBC 5.0 04/17/2025    HGB 14.5 04/17/2025    HCT 43.8 04/17/2025    MCV 91.1 04/17/2025     04/17/2025     Lab Results   Component Value Date    CHOL 171 04/17/2025    CHOL 193 09/27/2024    CHOL 174 03/01/2024     Lab Results   Component Value Date    HDL 64 04/17/2025    HDL 62.3 09/27/2024    HDL 53.0 03/01/2024     Lab Results   Component Value Date    LDLCALC 90 04/17/2025    LDLCALC 105 (H) 09/27/2024    LDLCALC 102 03/01/2024     Lab Results   Component Value Date    TRIG 82 04/17/2025    TRIG 131 09/27/2024    TRIG 96 03/01/2024     Lab Results   Component Value Date    HGBA1C 5.7 (H) 09/27/2024     Other labs not included in the list above were reviewed either before or during this encounter.    History    Medical History[1]  Surgical History[2]  Family History[3]  Allergies[4]  Medications Ordered Prior to Encounter[5]  Immunization History   Administered Date(s) Administered    Flu vaccine, quadrivalent, no egg protein, age 6 month or greater (FLUCELVAX) 01/06/2021    Flu vaccine, trivalent, preservative free, age 6 months and greater (Fluarix/Fluzone/Flulaval) 09/25/2024    Influenza, injectable, quadrivalent 10/03/2014    Influenza, seasonal, injectable 01/25/2013    Pfizer Purple Cap SARS-CoV-2 10/18/2021, 11/08/2021    Tdap vaccine, age 7 year and older (BOOSTRIX, ADACEL) 07/08/2009, 11/01/2019     Patient's medical history was reviewed and updated either before or during this  encounter.  ASSESSMENT / PLAN:  Diagnoses and all orders for this visit:  Annual physical exam  Impaired fasting glucose  Screening for prostate cancer  Sleep apnea, unspecified type  Erectile dysfunction, unspecified erectile dysfunction type  Essential (primary) hypertension  -     chlorthalidone (Hygroton) 25 mg tablet; Take 1 tablet (25 mg) by mouth once daily with breakfast.  -     losartan (Cozaar) 50 mg tablet; Take 1 tablet (50 mg) by mouth once daily at bedtime.  -     Comprehensive Metabolic Panel; Future  Pure hypercholesterolemia, unspecified  -     pravastatin (Pravachol) 40 mg tablet; Take 1 tablet (40 mg) by mouth once daily.  Screening for cardiovascular condition  -     CT cardiac scoring wo IV contrast; Future      Patient is being seen for annual physical examination.  Blood pressure is fairly controlled.  Denies any significant complaints.  No issues with the medications.  Liver enzymes and kidney functions have been stable.  Patient is being recommended to get CT cardiac scoring done for evaluation of cardiovascular disease.      Anthony Wolfe MD        [1]   Past Medical History:  Diagnosis Date    Carpal tunnel syndrome 09/16/2023    Cyst of epididymis 09/16/2023   [2] History reviewed. No pertinent surgical history.  [3]   Family History  Problem Relation Name Age of Onset    Hypertension Father Francois Stern     Heart disease Father Francois Stern     Hypertension Maternal Grandfather Elmer Moritz     Vision loss Paternal Grandmother Grandma Jarred    [4]   Allergies  Allergen Reactions    Atorvastatin Other     mental fog   [5]   Current Outpatient Medications on File Prior to Visit   Medication Sig Dispense Refill    ibuprofen (Advil Migraine) capsule Take 1 capsule (200 mg) by mouth. As needed      multivitamin capsule Take 1 capsule by mouth once daily.       No current facility-administered medications on file prior to visit.

## 2025-06-25 ENCOUNTER — HOSPITAL ENCOUNTER (OUTPATIENT)
Dept: RADIOLOGY | Facility: CLINIC | Age: 55
Discharge: HOME | End: 2025-06-25
Payer: COMMERCIAL

## 2025-06-25 ENCOUNTER — TELEPHONE (OUTPATIENT)
Dept: PRIMARY CARE | Facility: CLINIC | Age: 55
End: 2025-06-25
Payer: COMMERCIAL

## 2025-06-25 DIAGNOSIS — Z13.6 SCREENING FOR CARDIOVASCULAR CONDITION: ICD-10-CM

## 2025-06-25 PROCEDURE — 75571 CT HRT W/O DYE W/CA TEST: CPT

## 2025-06-25 NOTE — TELEPHONE ENCOUNTER
----- Message from Anthony Wolfe sent at 6/25/2025  2:09 PM EDT -----  Zero score.no further evaluation needed   ----- Message -----  From: Interface, Radiology Results In  Sent: 6/25/2025  10:36 AM EDT  To: Anthony Wolfe MD